# Patient Record
Sex: FEMALE | Race: BLACK OR AFRICAN AMERICAN | NOT HISPANIC OR LATINO | URBAN - METROPOLITAN AREA
[De-identification: names, ages, dates, MRNs, and addresses within clinical notes are randomized per-mention and may not be internally consistent; named-entity substitution may affect disease eponyms.]

---

## 2024-02-16 ENCOUNTER — EMERGENCY (EMERGENCY)
Facility: HOSPITAL | Age: 74
LOS: 1 days | Discharge: HOME | End: 2024-02-16
Attending: STUDENT IN AN ORGANIZED HEALTH CARE EDUCATION/TRAINING PROGRAM | Admitting: STUDENT IN AN ORGANIZED HEALTH CARE EDUCATION/TRAINING PROGRAM
Payer: COMMERCIAL

## 2024-02-16 VITALS
RESPIRATION RATE: 19 BRPM | DIASTOLIC BLOOD PRESSURE: 84 MMHG | HEIGHT: 63 IN | SYSTOLIC BLOOD PRESSURE: 165 MMHG | OXYGEN SATURATION: 100 % | WEIGHT: 149.91 LBS | HEART RATE: 90 BPM | TEMPERATURE: 99 F

## 2024-02-16 VITALS
RESPIRATION RATE: 18 BRPM | HEART RATE: 82 BPM | SYSTOLIC BLOOD PRESSURE: 152 MMHG | TEMPERATURE: 98 F | OXYGEN SATURATION: 100 % | DIASTOLIC BLOOD PRESSURE: 78 MMHG
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
## 2024-02-16 DIAGNOSIS — Z88.0 ALLERGY STATUS TO PENICILLIN: ICD-10-CM

## 2024-02-16 DIAGNOSIS — R07.81 PLEURODYNIA: ICD-10-CM

## 2024-02-16 DIAGNOSIS — S29.9XXA UNSPECIFIED INJURY OF THORAX, INITIAL ENCOUNTER: ICD-10-CM
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
## 2024-02-16 DIAGNOSIS — Z88.8 ALLERGY STATUS TO OTHER DRUGS, MEDICAMENTS AND BIOLOGICAL SUBSTANCES: ICD-10-CM

## 2024-02-16 PROCEDURE — 99284 EMERGENCY DEPT VISIT MOD MDM: CPT | Mod: 25

## 2024-02-16 PROCEDURE — 71250 CT THORAX DX C-: CPT | Mod: MA

## 2024-02-16 PROCEDURE — 99284 EMERGENCY DEPT VISIT MOD MDM: CPT

## 2024-02-16 PROCEDURE — 71250 CT THORAX DX C-: CPT | Mod: 26,MA

## 2024-02-16 RX ORDER — ACETAMINOPHEN 500 MG
650 TABLET ORAL ONCE
Refills: 0 | Status: COMPLETED | OUTPATIENT
Start: 2024-02-16 | End: 2024-02-16

## 2024-02-16 RX ADMIN — Medication 650 MILLIGRAM(S): at 19:51

## 2024-02-16 NOTE — ED ADULT NURSE NOTE - CHIEF COMPLAINT QUOTE
"I was rushing to get ready for work, lost my footing, then fell backwards and hit my right ribs on the toilet." Denies hitting head/LOC/blood thinners/any other injuries/CP.

## 2024-02-16 NOTE — ED PROVIDER NOTE - CLINICAL SUMMARY MEDICAL DECISION MAKING FREE TEXT BOX
Presents s/p mechanical fall w rib injury  Pt is not on AC  Injury to R anterior ribs  Will obtain: CT Chest to r/o fracture  Breathing comfortably on RA  No other injuries  Will provide analgesia

## 2024-02-16 NOTE — ED PROVIDER NOTE - OBJECTIVE STATEMENT
74 yo F w no pertinent PMH p/w R-sided rib pain s/p fall today. She was standing in her bathroom, lost balance, and fell into the edge of her tub, striking her anterior right lower rib cage. Throughout the day, her pain worsened in that area, leading her to come into the ED. She is able to take full deep breaths, however has pain with deep inspiration. No other injuries, no head trauma, no LOC.

## 2024-02-16 NOTE — ED PROVIDER NOTE - NSFOLLOWUPINSTRUCTIONS_ED_ALL_ED_FT
Follow up with your primary medical doctor as soon as possible.    Take medication for pain: Tylenol 650 mg every 6 hours (can be taken at the same time).   If the above medications are not working, take 1 tab of percocet every 6 hours as needed.    Return to the emergency department if your symptoms worsen or if you develop new symptoms.  If you have any problems with followup, please call the ED Referral Coordinator at [phone].    Rib Fracture(s)    A rib fracture is a break or crack in one of the bones of the ribs. The ribs are a group of long, curved bones that wrap around your chest and attach to your spine. A broken or cracked rib is often painful, but most do not cause other problems and heal on their own over time. Symptoms include pain when you breath or cough or pain when pressing over the area. Breathing exercises will help keep your lungs inflated and prevent lung collapse or infection.    SEEK IMMEDIATE MEDICAL CARE IF YOU HAVE ANY OF THE FOLLOWING SYMPTOMS: fever, shortness of breath, coughing up blood, abdominal pain, nausea or vomiting, pain not controlled with medications. Follow up with your primary medical doctor as soon as possible.    Take medication for pain: Tylenol 1000mg every 6 hours.     Return to the emergency department if your symptoms worsen or if you develop new symptoms.  If you have any problems with followup, please call the ED Referral Coordinator at [phone].    Rib injury    Symptoms include pain when you breath or cough or pain when pressing over the area. Breathing exercises will help keep your lungs inflated and prevent lung collapse or infection.    SEEK IMMEDIATE MEDICAL CARE IF YOU HAVE ANY OF THE FOLLOWING SYMPTOMS: fever, shortness of breath, coughing up blood, abdominal pain, nausea or vomiting, pain not controlled with medications.

## 2024-02-16 NOTE — ED PROVIDER NOTE - PATIENT PORTAL LINK FT
You can access the FollowMyHealth Patient Portal offered by [facility] by registering at the following website: [email or web address] By joining [employer] [employer]’s FollowMyHealth portal, you will also be able to view your health information using other applications (apps) compatible with our system.

## 2024-02-16 NOTE — ED PROVIDER NOTE - PHYSICAL EXAMINATION
CONSTITUTIONAL: Non-toxic; in no apparent distress  HEAD: Normocephalic; atraumatic  EYES: PERRL; EOM intact   ENMT: External appears normal  NECK: Supple; non-tender  CARD: Normal S1, S2; no murmurs, rubs, or gallops  CHEST: + ttp of R lower chest wall, no deformity, no skin changes  RESP: Normal chest excursion with respiration; breath sounds clear and equal bilaterally  ABD: Soft, non-distended; non-tender  EXT: Normal ROM in all four extremities; non-tender to palpation, no peripheral edema  SKIN: Warm, dry, no rash  NEURO:  No focal neurological deficiencies

## 2024-02-16 NOTE — ED ADULT NURSE NOTE - NSFALLCONCLUSION_ED_ALL_ED
This patient has been identified as being eligible for the Care Transitions program, a post-hospital discharge program designed to decrease readmission risk and increase continuity of care for patients. Awaiting discharge determination.   Fall Risk

## 2024-02-16 NOTE — ED ADULT NURSE NOTE - OBJECTIVE STATEMENT
73 y.o. Female A/Ox4 c/o R sided rib pain s/p mechanical trip and fall this AM when getting ready for work. Denies CP, SOB or difficulty breathing, head strike, loss of consciousness, headache, vision changes, numbness/tingling, nausea, vomiting, weakness, abd pain. Denies any other injury/trauma from fall. Unlabored respirations, speaking in clear full sentences. no acute distress. 73 y.o. Female A/Ox4 c/o R sided rib pain s/p mechanical trip and fall this AM when getting ready for work. Denies CP, SOB or difficulty breathing, head strike, loss of consciousness, headache, vision changes, numbness/tingling, nausea, vomiting, weakness, abd pain. Denies any other injury/trauma from fall. Unlabored respirations, speaking in clear full sentences. no acute distress. denies AC use.

## 2024-02-16 NOTE — ED ADULT NURSE NOTE - NSFALLRISKINTERV_ED_ALL_ED

## 2024-02-16 NOTE — ED PROVIDER NOTE - NS ED ROS FT
CONSTITUTIONAL: No fever, no chills, no fatigue  EYES: No eye redness, no visual changes  ENT: No ear pain, no sore throat  CARDIOVASCULAR: +chest pain, no palpitations  RESPIRATORY: No cough, no SOB  GI: No abdominal pain, no nausea, no vomiting, no constipation, no diarrhea  GENITOURINARY: No dysuria, no frequency, no hematuria  MUSCULOSKELETAL: No back pain, no joint pain, no myalgias, + rib pain  SKIN: No rash, no peripheral edema  NEURO: No headache, no confusion    ALL OTHER SYSTEMS NEGATIVE.
